# Patient Record
Sex: MALE | Race: WHITE | URBAN - METROPOLITAN AREA
[De-identification: names, ages, dates, MRNs, and addresses within clinical notes are randomized per-mention and may not be internally consistent; named-entity substitution may affect disease eponyms.]

---

## 2020-02-12 ENCOUNTER — APPOINTMENT (RX ONLY)
Dept: URBAN - METROPOLITAN AREA CLINIC 49 | Facility: CLINIC | Age: 65
Setting detail: DERMATOLOGY
End: 2020-02-12

## 2020-02-12 DIAGNOSIS — D485 NEOPLASM OF UNCERTAIN BEHAVIOR OF SKIN: ICD-10-CM

## 2020-02-12 PROBLEM — D48.5 NEOPLASM OF UNCERTAIN BEHAVIOR OF SKIN: Status: ACTIVE | Noted: 2020-02-12

## 2020-02-12 PROCEDURE — ? BIOPSY BY SHAVE METHOD

## 2020-02-12 PROCEDURE — 54100 BIOPSY OF PENIS: CPT

## 2020-02-12 ASSESSMENT — LOCATION ZONE DERM: LOCATION ZONE: PENIS

## 2020-02-12 ASSESSMENT — LOCATION DETAILED DESCRIPTION DERM: LOCATION DETAILED: VENTRAL PENILE SHAFT

## 2020-02-12 ASSESSMENT — LOCATION SIMPLE DESCRIPTION DERM: LOCATION SIMPLE: PENIS

## 2020-02-12 NOTE — HPI: EVALUATION OF SKIN LESION(S)
What Type Of Note Output Would You Prefer (Optional)?: Standard Output
Hpi Title: Evaluation of a Skin Lesion
How Severe Are Your Spot(S)?: mild
Have Your Spot(S) Been Treated In The Past?: has not been treated
Additional History: Patient states comes and goes for the last few months and is concerned.

## 2020-03-11 ENCOUNTER — APPOINTMENT (RX ONLY)
Dept: URBAN - METROPOLITAN AREA CLINIC 49 | Facility: CLINIC | Age: 65
Setting detail: DERMATOLOGY
End: 2020-03-11

## 2020-03-11 PROBLEM — C60.2 MALIGNANT NEOPLASM OF BODY OF PENIS: Status: ACTIVE | Noted: 2020-03-11

## 2020-03-11 PROCEDURE — 17311 MOHS 1 STAGE H/N/HF/G: CPT

## 2020-03-11 PROCEDURE — ? MOHS SURGERY

## 2020-03-11 PROCEDURE — ? PRESCRIPTION

## 2020-03-11 NOTE — PROCEDURE: MOHS SURGERY
no outdoor environmental allergies/no indoor environmental allergies/no reactions to food/no reactions to medicines/no reactions to animals/no reactions to insect bites Bcc Infiltrative Histology Text: There were numerous aggregates of basaloid cells demonstrating an infiltrative pattern.

## 2020-03-11 NOTE — PROCEDURE: MOHS SURGERY
Body Location Override (Optional - Billing Will Still Be Based On Selected Body Map Location If Applicable): ventral penile shaft

## 2020-03-11 NOTE — PROCEDURE: MOHS SURGERY
Secondary Intention Text (Leave Blank If You Do Not Want): Discussed repair options with the pt, inc graft and second intention.  Thin defect (just below dermis to superficial fascia). Pt would like to allow this to heal by second intention.  He understands there may be scarring and contracture, in which case he will need to see urology for revision.

## 2024-02-15 NOTE — PROCEDURE: MOHS SURGERY
Lab Facility: [id number] Performing Laboratory: [id number] Bill For Surgical Tray: no Billing Type: Third-Party Bill Expected Date Of Service: 02/15/2024 Hatchet Flap Text: The defect edges were debeveled with a #15 scalpel blade.  Given the location of the defect, shape of the defect and the proximity to free margins a hatchet flap was deemed most appropriate.  Using a sterile surgical marker, an appropriate hatchet flap was drawn incorporating the defect and placing the expected incisions within the relaxed skin tension lines where possible.    The area thus outlined was incised deep to adipose tissue with a #15 scalpel blade.  The skin margins were undermined to an appropriate distance in all directions utilizing iris scissors.

## 2025-06-02 NOTE — PROCEDURE: MOHS SURGERY
Scheduled for 6/30/25 with Dr. Aguilar   V-Y Plasty Text: The defect edges were debeveled with a #15 scalpel blade.  Given the location of the defect, shape of the defect and the proximity to free margins an V-Y advancement flap was deemed most appropriate.  Using a sterile surgical marker, an appropriate advancement flap was drawn incorporating the defect and placing the expected incisions within the relaxed skin tension lines where possible.    The area thus outlined was incised deep to adipose tissue with a #15 scalpel blade.  The skin margins were undermined to an appropriate distance in all directions utilizing iris scissors.

## 2025-06-18 NOTE — PROCEDURE: MOHS SURGERY
----- Message from Esther Le MD sent at 6/12/2025  6:02 PM CDT -----  Pt needs to make an appt with me asap to discuss abn test results   Suturegard Intro: Intraoperative tissue expansion was performed, utilizing the SUTUREGARD device, in order to reduce wound tension.